# Patient Record
Sex: FEMALE | Race: WHITE
[De-identification: names, ages, dates, MRNs, and addresses within clinical notes are randomized per-mention and may not be internally consistent; named-entity substitution may affect disease eponyms.]

---

## 2018-06-10 ENCOUNTER — HOSPITAL ENCOUNTER (EMERGENCY)
Dept: HOSPITAL 25 - UCCORT | Age: 38
Discharge: LEFT BEFORE DISCHARGE | End: 2018-06-10
Payer: COMMERCIAL

## 2018-06-10 VITALS — DIASTOLIC BLOOD PRESSURE: 82 MMHG | SYSTOLIC BLOOD PRESSURE: 150 MMHG

## 2018-06-10 DIAGNOSIS — R03.0: ICD-10-CM

## 2018-06-10 DIAGNOSIS — N39.0: Primary | ICD-10-CM

## 2018-06-10 DIAGNOSIS — F17.210: ICD-10-CM

## 2018-06-10 PROCEDURE — 81003 URINALYSIS AUTO W/O SCOPE: CPT

## 2018-06-10 PROCEDURE — 87086 URINE CULTURE/COLONY COUNT: CPT

## 2018-06-10 PROCEDURE — 96372 THER/PROPH/DIAG INJ SC/IM: CPT

## 2018-06-10 PROCEDURE — 84702 CHORIONIC GONADOTROPIN TEST: CPT

## 2018-06-10 PROCEDURE — G0463 HOSPITAL OUTPT CLINIC VISIT: HCPCS

## 2018-06-10 PROCEDURE — 99202 OFFICE O/P NEW SF 15 MIN: CPT

## 2018-06-10 NOTE — UC
Complaint Female HPI





- HPI Summary


HPI Summary: 


Patient completed a course of Keflex about 2 weeks ago for urinary tract 

infection.  Patient is to urgent care tonight with pain urgency burning and 

frequency.


C/o low back but no flank pain, no fever








- History Of Current Complaint


Chief Complaint: UCGU


Stated Complaint: URINARY


Time Seen by Provider: 06/10/18 16:21


Hx Obtained From: Patient


Hx Last Menstrual Period: 05/24/18


Pregnant?: No


Onset/Duration: Sudden Onset, Lasting Days - 2-3


Timing: Constant


Pain Intensity: 8


Pain Scale Used: 0-10 Numeric


Character: Burning


Aggravating Factor(s): Urination


Associated Signs And Symptoms: Positive: Negative





- Allergies/Home Medications


Allergies/Adverse Reactions: 


 Allergies











Allergy/AdvReac Type Severity Reaction Status Date / Time


 


No Known Allergies Allergy   Verified 06/10/18 15:18











Home Medications: 


 Home Medications





Gabapentin CAP(*) [Neurontin 300 CAP(*)] 600 mg PO Q8H 06/10/18 [History 

Confirmed 06/10/18]


busPIRone TAB* [Buspar TAB *] 15 mg PO BID 06/10/18 [History Confirmed 06/10/18]


hydrOXYzine HCl [Hydroxyzine HCl] 25 mg PO Q12H PRN 06/10/18 [History Confirmed 

06/10/18]











PMH/Surg Hx/FS Hx/Imm Hx


Previously Healthy: No - septic arthritis





- Surgical History


Surgical History: Yes


Surgery Procedure, Year, and Place: RIGHT HIP REPLACEMENT.  LEFT ACHILLES 

REPAIR.  LEFT ULNAR NERVE DECOMPRESSION.  RIGHT LEG VEIN STRIPPING.  UMBILICAL 

HERNIA REPAIR.  LAP CAMRON.  I&D OF BILATERAL LEGS WITH WOUND VACS





- Family History


Known Family History: Positive: None





- Social History


Occupation: Disabled


Lives: With Family


Alcohol Use: Occasionally


Substance Use Type: Marijuana


Substance Use Comment - Amount & Last Used: DAILY


Smoking Status (MU): Smoker, Current Status Unknown


Household Exposure Type: Cigarettes





Review of Systems


Constitutional: Negative


Skin: Negative


Eyes: Negative


ENT: Negative


Respiratory: Negative


Cardiovascular: Negative


Gastrointestinal: Negative


Genitourinary: Dysuria, Frequency, Urgency


Motor: Negative


Neurovascular: Negative


Musculoskeletal: Negative


Neurological: Negative


Psychological: Negative


Is Patient Immunocompromised?: No


All Other Systems Reviewed And Are Negative: Yes





Physical Exam


Triage Information Reviewed: Yes


Appearance: Well-Appearing, No Pain Distress, Well-Nourished


Vital Signs: 


 Initial Vital Signs











Temp  98.1 F   06/10/18 15:07


 


Pulse  109   06/10/18 15:07


 


Resp  18   06/10/18 15:07


 


BP  150/82   06/10/18 15:07


 


Pulse Ox  100   06/10/18 15:07











Vital Signs Reviewed: Yes


Eye Exam: Normal


Eyes: Positive: Conjunctiva Clear


ENT Exam: Normal


ENT: Positive: Normal ENT inspection, Hearing grossly normal.  Negative: 

Muffled voice, Hoarse voice


Dental Exam: Normal


Neck exam: Normal


Neck: Positive: Supple, Nontender


Respiratory Exam: Normal


Respiratory: Positive: Chest non-tender, Lungs clear, Normal breath sounds, No 

respiratory distress, No accessory muscle use


Cardiovascular Exam: Normal


Cardiovascular: Positive: RRR, No Murmur, Pulses Normal, Brisk Capillary Refill


Abdominal Exam: Normal


Abdomen Description: Positive: No Organomegaly, Soft, Other: - supra pubic 

discomfort.  Negative: CVA Tenderness (R), CVA Tenderness (L), Distended, 

Guarding


Bowel Sounds: Positive: Present


Musculoskeletal Exam: Normal


Musculoskeletal: Positive: Strength Intact, ROM Intact, No Edema


Neurological Exam: Normal


Neurological: Positive: Alert, Muscle Tone Normal


Psychological Exam: Normal


Skin Exam: Normal





Diagnostics





- Laboratory


Diagnostic Studies Completed/Ordered: ua-+3 blood, protien, leukoesterace





 Complaint Female Dx





- Course


Course Of Treatment: rocephin, omnicef, culture urine follow with pcp





- Differential Dx/Diagnosis


Provider Diagnoses: elevated BP without dx of Hypertension, Reoccuring UTI





Discharge





- Sign-Out/Discharge


Documenting (check all that apply): Discharge/Admit/Transfer





- Discharge Plan


Condition: Stable


Disposition: AGAINST MEDICAL ADVICE


Prescriptions: 


Cefdinir [Cefdinir 300 MG CAP] 300 mg PO BID #20 cap


Patient Education Materials:  Urinary Tract Infection in Women (DC), Dysuria (ED

), Hypertension (ED)


Referrals: 


Aniyah Laureano DO [Primary Care Provider] - 2 Weeks





- Billing Disposition and Condition


Condition: STABLE


Disposition: Against Medical Advice

## 2018-12-01 ENCOUNTER — HOSPITAL ENCOUNTER (EMERGENCY)
Dept: HOSPITAL 25 - UCCORT | Age: 38
Discharge: HOME | End: 2018-12-01
Payer: COMMERCIAL

## 2018-12-01 VITALS — DIASTOLIC BLOOD PRESSURE: 78 MMHG | SYSTOLIC BLOOD PRESSURE: 123 MMHG

## 2018-12-01 DIAGNOSIS — L03.211: Primary | ICD-10-CM

## 2018-12-01 DIAGNOSIS — F17.210: ICD-10-CM

## 2018-12-01 DIAGNOSIS — L02.01: ICD-10-CM

## 2018-12-01 DIAGNOSIS — F17.290: ICD-10-CM

## 2018-12-01 PROCEDURE — 99212 OFFICE O/P EST SF 10 MIN: CPT

## 2018-12-01 PROCEDURE — G0463 HOSPITAL OUTPT CLINIC VISIT: HCPCS

## 2018-12-01 NOTE — UC
Skin Complaint HPI





- HPI Summary


HPI Summary: 





The patient is a 38-year-old female with right facial pain and swelling 

overlying her right cheek.  She states that she has a history of cystic acne.  

She squeezed a pimple a few days ago.  Over the past 24 hour she has had marked 

swelling of her right cheek.  She denies any fever or chills.  She states that 

she just got some upsetting news via her cell phone and that is why her pulse 

was rapid.  She has had no nausea vomiting or diarrhea.  She denies any history 

of MRSA.





- History of Current Complaint


Chief Complaint: UCSkin


Time Seen by Provider: 12/01/18 16:02


Stated Complaint: RIGHT FACIAL SWELLING


Hx Obtained From: Patient


Hx Last Menstrual Period: 11/21/18


Onset/Duration: Gradual Onset


Skin Exposure Onset/Duration: Hours Ago


Timing: Constant


Onset Severity: Mild


Current Severity: Moderate


Pain Intensity: 5


Pain Scale Used: 0-10 Numeric


Character: Swelling, Redness, Raised, Painful


Aggravating Factor(s): Touch





- Allergy/Home Medications


Allergies/Adverse Reactions: 


 Allergies











Allergy/AdvReac Type Severity Reaction Status Date / Time


 


No Known Allergies Allergy   Verified 12/01/18 15:45











Home Medications: 


 Home Medications





Acetaminophen [Tylenol Extra Strength] 1,000 mg PO Q8H PRN 12/01/18 [History 

Confirmed 12/01/18]











Review of Systems


All Other Systems Reviewed And Are Negative: Yes


Constitutional: Positive: Negative


Skin: Positive: Negative


Eyes: Positive: Negative


ENT: Positive: Negative


Respiratory: Positive: Negative


Cardiovascular: Positive: Negative


Gastrointestinal: Positive: Negative


Genitourinary: Positive: Negative


Motor: Positive: Negative


Neurovascular: Positive: Negative


Musculoskeletal: Positive: Arthralgia - chronic


Neurological: Positive: Negative


Psychological: Positive: Negative





PMH/Surg Hx/FS Hx/Imm Hx


Previously Healthy: Yes





- Surgical History


Surgical History: Yes


Surgery Procedure, Year, and Place: RIGHT HIP REPLACEMENT.  LEFT ACHILLES 

REPAIR.  LEFT ULNAR NERVE DECOMPRESSION.  RIGHT LEG VEIN STRIPPING.  UMBILICAL 

HERNIA REPAIR.  LAP CAMRON.  I&D OF BILATERAL LEGS WITH WOUND VACS





- Family History


Known Family History: Positive: Hypertension





- Social History


Alcohol Use: Rare


Substance Use Type: Marijuana


Substance Use Comment - Amount & Last Used: DAILY


Smoking Status (MU): Smoker, Current Status Unknown


Type: Cigarettes, eCigarettes


Amount Used/How Often: 5 CIGS PER DAY


Household Exposure Type: Cigarettes





Physical Exam


Triage Information Reviewed: Yes


Appearance: Well-Appearing - non toxic, No Pain Distress, Well-Nourished


Vital Signs: 


 Initial Vital Signs











Temp  98 F   12/01/18 15:48


 


Pulse  108   12/01/18 15:48


 


Resp  17   12/01/18 15:48


 


BP  123/78   12/01/18 15:48


 


Pulse Ox  99   12/01/18 15:48











Eyes: Positive: Conjunctiva Clear


ENT: Positive: Hearing grossly normal.  Negative: Nasal congestion, Nasal 

drainage, Trismus, Muffled voice


Neck: Positive: Supple, Nontender, No Lymphadenopathy


Respiratory: Positive: Lungs clear, Normal breath sounds, No respiratory 

distress, No accessory muscle use


Cardiovascular: Positive: RRR, No Murmur, Tachycardia


Musculoskeletal: Positive: ROM Intact, No Edema


Neurological: Positive: Alert


Psychological Exam: Normal


Skin Exam: Other - see image





Images


Head: 


  __________________________














  __________________________





 1 - 2x2cm area of induration





 2 - overlying erythema/lower lid edema








Course/Dx





- Diagnoses


Provider Diagnosis: 


 Cellulitis, face, Facial abscess








Discharge





- Sign-Out/Discharge


Documenting (check all that apply): Patient Departure


All imaging exams completed and their final reports reviewed: No Studies





- Discharge Plan


Condition: Stable


Disposition: HOME


Prescriptions: 


Cephalexin CAP* [Keflex CAP*] 500 mg PO QID #28 cap


Sulfamethox/Trimethoprim DS* [Bactrim /160 TAB*] 1 tab PO BID #14 tab


Patient Education Materials:  Cellulitis (ED), Abscess (ED)


Referrals: 


Aniyah Laureano DO [Primary Care Provider] - As Soon As Possible


Additional Instructions: 


warm compresses





don't squeeze





TO ER FOR NEW OR WORSENING SYMPTOMS





recheck in 48 hours if not improved











- Billing Disposition and Condition


Condition: STABLE


Disposition: Home